# Patient Record
Sex: MALE | Race: WHITE | Employment: FULL TIME | URBAN - METROPOLITAN AREA
[De-identification: names, ages, dates, MRNs, and addresses within clinical notes are randomized per-mention and may not be internally consistent; named-entity substitution may affect disease eponyms.]

---

## 2020-08-29 ENCOUNTER — OFFICE VISIT (OUTPATIENT)
Dept: URGENT CARE | Facility: URGENT CARE | Age: 40
End: 2020-08-29
Payer: COMMERCIAL

## 2020-08-29 ENCOUNTER — NURSE TRIAGE (OUTPATIENT)
Dept: NURSING | Facility: CLINIC | Age: 40
End: 2020-08-29

## 2020-08-29 VITALS
HEART RATE: 102 BPM | SYSTOLIC BLOOD PRESSURE: 142 MMHG | BODY MASS INDEX: 31.4 KG/M2 | TEMPERATURE: 101 F | HEIGHT: 69 IN | WEIGHT: 212 LBS | DIASTOLIC BLOOD PRESSURE: 96 MMHG | OXYGEN SATURATION: 98 %

## 2020-08-29 DIAGNOSIS — R50.9 FEVER, UNSPECIFIED FEVER CAUSE: ICD-10-CM

## 2020-08-29 DIAGNOSIS — H92.01 OTALGIA, RIGHT: Primary | ICD-10-CM

## 2020-08-29 PROCEDURE — 99203 OFFICE O/P NEW LOW 30 MIN: CPT | Performed by: FAMILY MEDICINE

## 2020-08-29 PROCEDURE — U0003 INFECTIOUS AGENT DETECTION BY NUCLEIC ACID (DNA OR RNA); SEVERE ACUTE RESPIRATORY SYNDROME CORONAVIRUS 2 (SARS-COV-2) (CORONAVIRUS DISEASE [COVID-19]), AMPLIFIED PROBE TECHNIQUE, MAKING USE OF HIGH THROUGHPUT TECHNOLOGIES AS DESCRIBED BY CMS-2020-01-R: HCPCS | Performed by: FAMILY MEDICINE

## 2020-08-29 RX ORDER — CIPROFLOXACIN AND DEXAMETHASONE 3; 1 MG/ML; MG/ML
4 SUSPENSION/ DROPS AURICULAR (OTIC) 2 TIMES DAILY
Qty: 2.8 ML | Refills: 0 | Status: SHIPPED | OUTPATIENT
Start: 2020-08-29 | End: 2020-09-05

## 2020-08-29 RX ORDER — IBUPROFEN 400 MG/1
400 TABLET, FILM COATED ORAL EVERY 6 HOURS PRN
COMMUNITY

## 2020-08-29 RX ORDER — ACETAMINOPHEN 325 MG/1
325-650 TABLET ORAL EVERY 6 HOURS PRN
COMMUNITY

## 2020-08-29 RX ORDER — DOXYCYCLINE HYCLATE 100 MG
100 TABLET ORAL 2 TIMES DAILY
Qty: 20 TABLET | Refills: 0 | Status: SHIPPED | OUTPATIENT
Start: 2020-08-29 | End: 2020-09-08

## 2020-08-29 ASSESSMENT — MIFFLIN-ST. JEOR: SCORE: 1872.01

## 2020-08-29 NOTE — PROGRESS NOTES
"Subjective     Luis Kessler is a 39 year old male who presents to clinic today for the following health issues:    HPI     Eye(s) Problem    Chief Complaint   Patient presents with     Urgent Care     Ear Problem     Right ear has been very painful since Thursday evening, had some body aches last evening.  States he's been \"living on pain killers.\"  Has been taking excedrin migraine, tylenol and ibuprofen.     Onset/Duration: 2 days   Description:   Location: right ear pain  Pain: YES  Redness: no  Accompanying Signs & Symptoms:  Discharge/mattering: no  Swelling: no  Visual changes: no  Fever: YES  Nasal Congestion: no  Bothered by bright lights: no  History:  Trauma: no  Foreign body exposure: no  Wearing contacts: no  Precipitating or alleviating factors: None  Therapies tried and outcome: Excedrin, ibuprofen, tylenol   Patient does swimming in use Q-tips for ear cleaning      Review of Systems   Constitutional, HEENT, cardiovascular, pulmonary, gi and gu systems are negative, except as otherwise noted.      Objective    BP (!) 142/96   Pulse 102   Temp 101  F (38.3  C) (Oral)   Ht 1.753 m (5' 9\")   Wt 96.2 kg (212 lb)   SpO2 98%   BMI 31.31 kg/m    Body mass index is 31.31 kg/m .  Physical Exam   GENERAL: alert, no distress and obese  EYES: Eyes grossly normal to inspection, PERRL and conjunctivae and sclerae normal  HENT: normal cephalic/atraumatic, right ear:auricle, tragus tender on palpation, warmth on palpation, slightly erythematous, tympanic membrane not visualized due to impacted cerumen, left ear: occluded with wax, nose and mouth without ulcers or lesions, oropharynx clear and oral mucous membranes moist  NECK: no adenopathy, no asymmetry, masses, or scars and thyroid normal to palpation  RESP: lungs clear to auscultation - no rales, rhonchi or wheezes  CV: tachycardia, normal S1 S2, no S3 or S4 and no murmur, click or rub  ABDOMEN: soft, nontender, no hepatosplenomegaly, no masses and " bowel sounds normal  MS: no gross musculoskeletal defects noted, no edema  SKIN: no suspicious lesions or rashes  NEURO: Normal strength and tone, mentation intact and speech normal      Assessment & Plan       ICD-10-CM    1. Otalgia, right  H92.01 doxycycline hyclate (VIBRA-TABS) 100 MG tablet     ciprofloxacin-dexamethasone (CIPRODEX) 0.3-0.1 % otic suspension   2. Fever, unspecified fever cause  R50.9 Symptomatic COVID-19 Virus (Coronavirus) by PCR       Otalgia, right  --Differentials discussed in detail including but not limited to otitis media, otitis externa, cellulitis, dental infection, parotitis.  Physical examination remarkable for tender tragus pull, unable to visualize tympanic membrane due to impacted cerumen.  Patient allergic to penicillin.  Doxycycline and Ciprodex prescribed for covering right otitis media and otitis externa, common side effects discussed.  Suggested to continue well hydration, over-the-counter analgesia.  We will do COVID-19 as well considering fever.  Instructed to go ER if symptoms worsen over the weekend otherwise follow-up with PCP early next week.  Patient understood and in agreement with above plan.  All questions answered.  - doxycycline hyclate (VIBRA-TABS) 100 MG tablet; Take 1 tablet (100 mg) by mouth 2 times daily for 10 days  - ciprofloxacin-dexamethasone (CIPRODEX) 0.3-0.1 % otic suspension; Place 4 drops into the right ear 2 times daily for 7 days        Nilesh Parker MD  [facility]

## 2020-08-29 NOTE — TELEPHONE ENCOUNTER
"Patient states he had a fever last night that has now resolved.  Complaining of right ear/jaw pain that started on 8/27/20.  States the entire right side of head hurts; hurts to open his mouth.  Has been taking Excedrin, Ibuprofen and icing area which is the only way he is able to get any sleep.  States ear feels like there is fluid in it but no drainage.  Ear and jaw are pink and warm.  Unable to determine if bone behind ear is pink/swollen.  Advised patient be seen at  and provided hours for [facility] per patient's request.    Additional Information    Moving the earlobe or touching the ear clearly increases the pain    Negative: Recently examined and diagnosed with \"Otitis Externa\" or \"Swimmer's Ear\"    Negative: [1] Stiff neck (unable to touch chin to chest) AND [2] fever    Negative: [1] Stiff neck (unable to touch chin to chest) AND [2] headache    Negative: Bony area of skull behind the ear is pink or swollen    Negative: Patient sounds very sick or weak to the triager    [1] SEVERE pain and [2] not improved 2 hours after analgesic medication (e.g., ibuprofen or acetaminophen)    Protocols used: EARACHE-A-AH, EAR - SWIMMER'S-A-AH      "

## 2020-08-31 LAB
SARS-COV-2 RNA SPEC QL NAA+PROBE: NOT DETECTED
SPECIMEN SOURCE: NORMAL

## 2021-01-03 ENCOUNTER — HEALTH MAINTENANCE LETTER (OUTPATIENT)
Age: 41
End: 2021-01-03

## 2021-10-10 ENCOUNTER — HEALTH MAINTENANCE LETTER (OUTPATIENT)
Age: 41
End: 2021-10-10

## 2022-01-27 DIAGNOSIS — Z31.41 FERTILITY TESTING: Primary | ICD-10-CM

## 2022-01-30 ENCOUNTER — HEALTH MAINTENANCE LETTER (OUTPATIENT)
Age: 42
End: 2022-01-30

## 2022-03-18 ENCOUNTER — ANCILLARY PROCEDURE (OUTPATIENT)
Dept: ULTRASOUND IMAGING | Facility: CLINIC | Age: 42
End: 2022-03-18
Attending: NURSE PRACTITIONER
Payer: COMMERCIAL

## 2022-03-18 DIAGNOSIS — N50.89 ENLARGED TESTICLE: ICD-10-CM

## 2022-03-18 PROCEDURE — 76870 US EXAM SCROTUM: CPT | Performed by: RADIOLOGY

## 2022-09-18 ENCOUNTER — HEALTH MAINTENANCE LETTER (OUTPATIENT)
Age: 42
End: 2022-09-18

## 2023-05-07 ENCOUNTER — HEALTH MAINTENANCE LETTER (OUTPATIENT)
Age: 43
End: 2023-05-07

## 2023-09-10 ENCOUNTER — OFFICE VISIT (OUTPATIENT)
Dept: FAMILY MEDICINE | Facility: CLINIC | Age: 43
End: 2023-09-10
Payer: COMMERCIAL

## 2023-09-10 VITALS
SYSTOLIC BLOOD PRESSURE: 142 MMHG | HEART RATE: 103 BPM | TEMPERATURE: 97.6 F | DIASTOLIC BLOOD PRESSURE: 86 MMHG | OXYGEN SATURATION: 97 %

## 2023-09-10 DIAGNOSIS — T63.441A ALLERGIC REACTION TO BEE STING: ICD-10-CM

## 2023-09-10 DIAGNOSIS — J39.2: ICD-10-CM

## 2023-09-10 DIAGNOSIS — T78.3XXA ANGIOEDEMA, INITIAL ENCOUNTER: Primary | ICD-10-CM

## 2023-09-10 PROCEDURE — 99204 OFFICE O/P NEW MOD 45 MIN: CPT

## 2023-09-10 PROCEDURE — 96372 THER/PROPH/DIAG INJ SC/IM: CPT | Performed by: PHYSICIAN ASSISTANT

## 2023-09-10 RX ORDER — EPINEPHRINE 0.3 MG/.3ML
0.3 INJECTION SUBCUTANEOUS ONCE
Status: COMPLETED | OUTPATIENT
Start: 2023-09-10 | End: 2023-09-10

## 2023-09-10 RX ORDER — PREDNISONE 20 MG/1
40 TABLET ORAL DAILY
Qty: 8 TABLET | Refills: 0 | Status: SHIPPED | OUTPATIENT
Start: 2023-09-11 | End: 2023-09-15

## 2023-09-10 RX ORDER — EPINEPHRINE 0.3 MG/.3ML
0.3 INJECTION SUBCUTANEOUS PRN
Qty: 2 EACH | Refills: 1 | Status: SHIPPED | OUTPATIENT
Start: 2023-09-10

## 2023-09-10 RX ORDER — METHYLPREDNISOLONE SOD SUCC 125 MG
125 VIAL (EA) INJECTION ONCE
Status: COMPLETED | OUTPATIENT
Start: 2023-09-10 | End: 2023-09-10

## 2023-09-10 RX ADMIN — Medication 125 MG: at 16:16

## 2023-09-10 RX ADMIN — EPINEPHRINE 0.3 MG: 0.3 INJECTION SUBCUTANEOUS at 16:23

## 2023-09-10 ASSESSMENT — ENCOUNTER SYMPTOMS
FEVER: 0
SORE THROAT: 1
NAUSEA: 0
TROUBLE SWALLOWING: 1
WHEEZING: 0
VOICE CHANGE: 1
VOMITING: 0
CHILLS: 0
SHORTNESS OF BREATH: 1
ABDOMINAL PAIN: 0
CHEST TIGHTNESS: 0

## 2023-09-10 NOTE — PATIENT INSTRUCTIONS
Follow up in the Emergency room for further evaluation at the onset of tongue swelling, shortness of breath, lip swelling.  Use the EpiPen first, and call 911.  Follow-up with Allergist for further evaluation.

## 2023-09-10 NOTE — PROGRESS NOTES
Assessment & Plan      1. Angioedema, initial encounter    -Patient was given IM Solu-Medrol in the clinic.  Patient tolerated the medication well.  Patient reported reduction in tongue swelling after 30 minutes.  He was able to talk in a normal voice and swallow.  The lip swelling improved. Physical exam showed tongue swelling resolving, uvula midline and pharynx edema resolved.     - methylPREDNISolone sodium succinate (solu-MEDROL) injection 125 mg  - EPINEPHrine (ANY BX GENERIC EQUIV) injection 0.3 mg  - EPINEPHrine (ANY BX GENERIC EQUIV) 0.3 MG/0.3ML injection 2-pack; Inject 0.3 mLs (0.3 mg) into the muscle as needed for anaphylaxis May repeat one time in 5-15 minutes if response to initial dose is inadequate.  Dispense: 2 each; Refill: 1  - predniSONE (DELTASONE) 20 MG tablet; Take 2 tablets (40 mg) by mouth daily for 4 days  Dispense: 8 tablet; Refill: 0    2. Allergic reaction to bee sting    -Patient was discharged with EpiPen's.  Instructions on how to use the EpiPen's were given to patient's wife.  Emergent referral to Allergist completed.  Patient will also start prednisone tomorrow, 40 mg once daily  for 4 days.    - methylPREDNISolone sodium succinate (solu-MEDROL) injection 125 mg  - EPINEPHrine (ANY BX GENERIC EQUIV) injection 0.3 mg  - EPINEPHrine (ANY BX GENERIC EQUIV) 0.3 MG/0.3ML injection 2-pack; Inject 0.3 mLs (0.3 mg) into the muscle as needed for anaphylaxis May repeat one time in 5-15 minutes if response to initial dose is inadequate.  Dispense: 2 each; Refill: 1  - Adult Allergy/Asthma  Referral; Future  - predniSONE (DELTASONE) 20 MG tablet; Take 2 tablets (40 mg) by mouth daily for 4 days  Dispense: 8 tablet; Refill: 0    3. Edema of pharynx    - EPINEPHrine (ANY BX GENERIC EQUIV) injection 0.3 mg  - EPINEPHrine (ANY BX GENERIC EQUIV) 0.3 MG/0.3ML injection 2-pack; Inject 0.3 mLs (0.3 mg) into the muscle as needed for anaphylaxis May repeat one time in 5-15 minutes if response to  initial dose is inadequate.  Dispense: 2 each; Refill: 1      Patient Instructions   Follow up in the Emergency room for further evaluation at the onset of tongue swelling, shortness of breath, lip swelling.  Use the EpiPen first, and call 911.  Follow-up with Allergist for further evaluation.    Return if symptoms worsen or fail to improve, for Follow up.    At the end of the encounter, I discussed results, diagnosis, medications. Discussed red flags for immediate return to clinic/ER, as well as indications for follow up if no improvement. Patient understood and agreed to plan. Patient was stable for discharge.    David Smith is a 42 year old male who presents to clinic today with wife for the following health issues:  Chief Complaint   Patient presents with    Insect Bite     Bee sting on tongue x 2 hours ago- took two benadryl and I Advil  Difficulty swallowing, swollen, tongue and lip       HPI    Patient reports bee sting on the tongue x 2 hours ago.  Patient reports difficult swallowing, tongue swelling, throat swelling and lip swelling.  He is having hard time talking or opening his mouth. His voice is hoarse and has mild shortness of breath. He took Benadryl and Advil. Denies wheezing, chest pain, chest tightness, rash or hives. He has had bee sting before on other body parts and usually has swelling which resolves after one week.     Review of Systems   Constitutional:  Negative for chills and fever.   HENT:  Positive for sore throat, trouble swallowing and voice change.    Respiratory:  Positive for shortness of breath. Negative for chest tightness and wheezing.    Gastrointestinal:  Negative for abdominal pain, nausea and vomiting.   All other systems reviewed and are negative.      Problem List:  There are no relevant problems documented for this patient.      No past medical history on file.    Social History     Tobacco Use    Smoking status: Never    Smokeless tobacco: Never   Substance Use  Topics    Alcohol use: Not on file           Objective    BP (!) 142/86   Pulse 103   Temp 97.6  F (36.4  C) (Tympanic)   SpO2 97%   Physical Exam  HENT:      Head: Normocephalic.      Mouth/Throat:      Mouth: Angioedema present.      Pharynx: Uvula midline. Pharyngeal swelling present.      Comments: Tongue edema   Lower lip swelling    Cardiovascular:      Rate and Rhythm: Normal rate and regular rhythm.   Pulmonary:      Effort: Pulmonary effort is normal.      Breath sounds: Normal breath sounds.   Lymphadenopathy:      Head:      Right side of head: No submental, submandibular or tonsillar adenopathy.      Left side of head: No submental, submandibular or tonsillar adenopathy.      Cervical: No cervical adenopathy.      Right cervical: No superficial cervical adenopathy.     Left cervical: No superficial cervical adenopathy.   Skin:     General: Skin is warm and dry.      Findings: No rash.   Neurological:      Mental Status: He is alert.   Psychiatric:         Mood and Affect: Mood normal.         Behavior: Behavior normal.              Renae Mccall PA-C

## 2023-09-11 ENCOUNTER — TELEPHONE (OUTPATIENT)
Dept: ALLERGY | Facility: CLINIC | Age: 43
End: 2023-09-11
Payer: COMMERCIAL

## 2023-09-11 NOTE — TELEPHONE ENCOUNTER
This encounter is being sent to inform the clinic that this patient has a referral from Renae Mccall PA-C in [facility] for the diagnoses of Allergic reaction to bee sting and has requested that this patient be seen within Emergency: 1-2 Days. Based on the availability of our provider(s), we are unable to accommodate this request.    Were all sites offered this patient?  Yes  Patient prefers [facility] or [facility]  Does scheduling algorithm request to schedule next available?  Patient has been scheduled for the first available opening with Prudencio Early MD  on 1/5/24.  We have informed the patient that the clinic will review their referral and reach out if a sooner appointment is medically necessary.      Can leave a detailed message at [phone]

## 2023-09-11 NOTE — TELEPHONE ENCOUNTER
Called pt and scheduled for 10/3/23 at 1 pm. Pt went to ED due to being bee stung. pt feels stable and is taking Benadryl and  Prednisone. Patient verbalized good understanding. No further questions      Jerson LEWIS MA.

## 2023-09-11 NOTE — TELEPHONE ENCOUNTER
Reviewed case with Dr. Early. OK to schedule in [facility] spot.    Left message for patient to return call to clinic to be scheduled.    ELIA HahnN, RN  9/11/2023 11:39 AM

## 2023-10-03 ENCOUNTER — OFFICE VISIT (OUTPATIENT)
Dept: ALLERGY | Facility: CLINIC | Age: 43
End: 2023-10-03
Attending: PHYSICIAN ASSISTANT
Payer: COMMERCIAL

## 2023-10-03 ENCOUNTER — LAB (OUTPATIENT)
Dept: LAB | Facility: CLINIC | Age: 43
End: 2023-10-03
Payer: COMMERCIAL

## 2023-10-03 VITALS
WEIGHT: 206.4 LBS | OXYGEN SATURATION: 98 % | BODY MASS INDEX: 30.48 KG/M2 | HEART RATE: 80 BPM | DIASTOLIC BLOOD PRESSURE: 86 MMHG | SYSTOLIC BLOOD PRESSURE: 136 MMHG | RESPIRATION RATE: 16 BRPM

## 2023-10-03 DIAGNOSIS — T36.0X5A ADVERSE EFFECT OF PENICILLIN, INITIAL ENCOUNTER: Primary | ICD-10-CM

## 2023-10-03 DIAGNOSIS — T63.441A ALLERGIC REACTION TO BEE STING: ICD-10-CM

## 2023-10-03 PROCEDURE — 86003 ALLG SPEC IGE CRUDE XTRC EA: CPT | Mod: 59

## 2023-10-03 PROCEDURE — 99203 OFFICE O/P NEW LOW 30 MIN: CPT | Performed by: INTERNAL MEDICINE

## 2023-10-03 PROCEDURE — 86003 ALLG SPEC IGE CRUDE XTRC EA: CPT

## 2023-10-03 PROCEDURE — 36415 COLL VENOUS BLD VENIPUNCTURE: CPT

## 2023-10-03 RX ORDER — LISINOPRIL 10 MG/1
1 TABLET ORAL
COMMUNITY
Start: 2023-08-27

## 2023-10-03 RX ORDER — PREDNISONE 50 MG/1
50 TABLET ORAL DAILY PRN
Qty: 10 TABLET | Refills: 1 | Status: SHIPPED | OUTPATIENT
Start: 2023-10-03

## 2023-10-03 ASSESSMENT — ENCOUNTER SYMPTOMS
COUGH: 0
FEVER: 0

## 2023-10-03 NOTE — PATIENT INSTRUCTIONS
If stung by a bee, take 50 mg of prednisone and 10 mg of Zyrtec  Will check for a bee allergy by blood testing  Carry an Epipen for now (until we see the results of the blood tests)  Your reaction sounds like a large local reaction, which you unfortunately got stung on the tongue.   Let me know if you would like to do an oral challenge to Amoxicillin in the future      Allergy Staff Appt Hours Shot Hours Location       Physician   Prudencio Early MD      Support Staff   KAYLIN Lanza, KAYLIN CORTEZ MA         Mondays Tuesdays Thursdays and Fridays:      [facility] 7-5      Wednesdays         Close                Mondays, Tuesdays and Fridays:  7:20 - 3:40              [facility]  [address] [facility] [address]  [place] [address]  Allergy appointment  line: [phone]    Pulmonary Function Scheduling:  [place]: [phone]           Questions about cost of your care  For questions about your cost of your visit, procedure, lab or imaging contact: [employer] Price Line [phone] or visit:  [email or web address]    Prescription Assistance  If you need assistance with your prescriptions (cost, coverage, etc) please contact: [employer] Prescription Assistance Program [phone]    Important Scheduling Information  All visits for food challenges, medication/drug allergy testing, and drug challenges MUST be scheduled through the allergy clinic nurse. Please contact them via [employer] or by calling the clinic at [phone] and asking to speak with an allergy nurse. They will provide additional information and instructions for the appointment. Discontinue oral antihistamines 7 days prior to the appointment. Discontinue nasal and ocular antihistamines 1 day prior to the appointment.    Appointments for skin testing: Appointment will last approximately 45 minutes.  Please call the appointment line for your clinic to schedule.  Discontinue oral  antihistamines 7 days prior to the appointment.  Discontinue nasal and ocular antihistamines 1 days prior to appointment.    Thank you for trusting us with your care. Please feel free to contact us with any questions or concerns you may have.

## 2023-10-03 NOTE — LETTER
10/3/2023         RE: Luis Kessler  [address]  [facility] [address]        Dear Colleague,    Thank you for referring your patient, Luis Kessler, to the [facility]. Please see a copy of my visit note below.    Luis Kessler was seen in the Allergy Clinic at [facility].    Luis Kessler is a 42 year old male being seen today at the request of Renae Mccall PA-C  in consultation for Allergic reaction to bee sting.   He was stung on the tongue on September 10 and received multiple therapies including epinephrine and Solu-Medrol.  The tongue swelling did improve.  He was having difficulty swallowing as well as talking.  He had not been stung on the tongue in the past.  When he has been stung on the feet he will have swelling of his feet for a couple weeks where it is difficult to wear shoes.  He has not had systemic symptoms such as hives or shortness of breath or vomiting.  He was prescribed an EpiPen.  He is here for venom allergy evaluation.    In addition he does have a penicillin allergy listed.  He is wondering if he should have that evaluated why he is here today.    No past medical history on file.  No family history on file.  No past surgical history on file.    ENVIRONMENTAL HISTORY:   Pets inside the house include None.  Do you smoke cigarettes or other recreational drugs? No There is/are 0 smokers living in the house. The house does not have a damp basement.     SOCIAL HISTORY:   Luis is employed as . He lives with his family.      Review of Systems   Constitutional:  Negative for fever.   HENT:  Negative for sneezing.    Respiratory:  Negative for cough.    Skin:  Negative for rash.         Current Outpatient Medications:      lisinopril (ZESTRIL) 10 MG tablet, Take 1 tablet by mouth daily at 2 pm, Disp: , Rfl:      predniSONE (DELTASONE) 50 MG tablet, Take 1 tablet (50 mg) by mouth daily as needed (Take if  stung by a bee), Disp: 10 tablet, Rfl: 1     acetaminophen (TYLENOL) 325 MG tablet, Take 325-650 mg by mouth every 6 hours as needed for mild pain, Disp: , Rfl:      aspirin-acetaminophen-caffeine (EXCEDRIN MIGRAINE) 250-250-65 MG tablet, Take 1 tablet by mouth every 6 hours as needed for headaches, Disp: , Rfl:      EPINEPHrine (ANY BX GENERIC EQUIV) 0.3 MG/0.3ML injection 2-pack, Inject 0.3 mLs (0.3 mg) into the muscle as needed for anaphylaxis May repeat one time in 5-15 minutes if response to initial dose is inadequate., Disp: 2 each, Rfl: 1     ibuprofen (ADVIL/MOTRIN) 400 MG tablet, Take 400 mg by mouth every 6 hours as needed for moderate pain, Disp: , Rfl:   Allergies   Allergen Reactions     Penicillins                EXAM:   /86 (BP Location: Left arm, Patient Position: Sitting, Cuff Size: Adult Regular)   Pulse 80   Resp 16   Wt 93.6 kg (206 lb 6.4 oz)   SpO2 98%   BMI 30.48 kg/m      Physical Exam    Constitutional:       General: He is not in acute distress.     Appearance: Normal appearance. He is not ill-appearing.   HENT:      Head: Normocephalic and atraumatic.   Eyes:      General:         Right eye: No discharge.         Left eye: No discharge.   Neurological:      General: No focal deficit present.      Mental Status: He is alert. Mental status is at baseline.   Psychiatric:         Mood and Affect: Mood normal.         Behavior: Behavior normal.        ASSESSMENT/PLAN:  Luis Kessler is a 42 year old male seen today for venom allergy evaluation.  He was stung on the tongue and developed lip swelling resulting in difficulty breathing as well as difficulty speaking.  He did receive treatment including epinephrine and Solu-Medrol.  He was prescribed epinephrine.  He has had a history of large local reactions when stung on the feet in the past.  We will order IgE levels to the venoms.  He has not had symptoms concerning for systemic reactions but it sounds consistent with a large  local reaction. The sting on the tongue caused significant symptoms.    Due to the history of large local reactions we will prescribe prednisone to have on hand to take if he is stung in the future.  This should help minimize symptoms.  If he does have IgE elevation will discuss other options such as venom immunotherapy.    If stung by a bee, take 50 mg of prednisone and 10 mg of Zyrtec  Will check for a bee allergy by blood testing  Carry an Epipen for now (until we see the results of the blood tests)  Your reaction sounds like a large local reaction, which you unfortunately got stung on the tongue.   The penicillin oral challenge was discussed today.  He wanted to try to talk to the [employer] insurance line to get an estimate of cost.  He was placed on hold for 20 minutes.  He will look into this further and consider doing this at a later date.    Follow-up if he has positive blood tests to the bees.      Thank you for allowing me to participate in the care of Luis Kessler.      I spent 30 minutes on the date of the encounter doing chart review, history and exam, documentation and further coordination as noted above exclusive of separately reported interpretations    Prudencio Early MD  Allergy/Immunology  [facility]         Again, thank you for allowing me to participate in the care of your patient.        Sincerely,        Prudencio Early MD

## 2023-10-03 NOTE — PROGRESS NOTES
Luis Kessler was seen in the Allergy Clinic at [facility].    Luis Kessler is a 42 year old male being seen today at the request of Renae Mccall PA-C  in consultation for Allergic reaction to bee sting.   He was stung on the tongue on September 10 and received multiple therapies including epinephrine and Solu-Medrol.  The tongue swelling did improve.  He was having difficulty swallowing as well as talking.  He had not been stung on the tongue in the past.  When he has been stung on the feet he will have swelling of his feet for a couple weeks where it is difficult to wear shoes.  He has not had systemic symptoms such as hives or shortness of breath or vomiting.  He was prescribed an EpiPen.  He is here for venom allergy evaluation.    In addition he does have a penicillin allergy listed.  He is wondering if he should have that evaluated why he is here today.    No past medical history on file.  No family history on file.  No past surgical history on file.    ENVIRONMENTAL HISTORY:   Pets inside the house include None.  Do you smoke cigarettes or other recreational drugs? No There is/are 0 smokers living in the house. The house does not have a damp basement.     SOCIAL HISTORY:   Luis is employed as . He lives with his family.      Review of Systems   Constitutional:  Negative for fever.   HENT:  Negative for sneezing.    Respiratory:  Negative for cough.    Skin:  Negative for rash.         Current Outpatient Medications:     lisinopril (ZESTRIL) 10 MG tablet, Take 1 tablet by mouth daily at 2 pm, Disp: , Rfl:     predniSONE (DELTASONE) 50 MG tablet, Take 1 tablet (50 mg) by mouth daily as needed (Take if stung by a bee), Disp: 10 tablet, Rfl: 1    acetaminophen (TYLENOL) 325 MG tablet, Take 325-650 mg by mouth every 6 hours as needed for mild pain, Disp: , Rfl:     aspirin-acetaminophen-caffeine (EXCEDRIN MIGRAINE) 250-250-65 MG tablet, Take 1 tablet by mouth  every 6 hours as needed for headaches, Disp: , Rfl:     EPINEPHrine (ANY BX GENERIC EQUIV) 0.3 MG/0.3ML injection 2-pack, Inject 0.3 mLs (0.3 mg) into the muscle as needed for anaphylaxis May repeat one time in 5-15 minutes if response to initial dose is inadequate., Disp: 2 each, Rfl: 1    ibuprofen (ADVIL/MOTRIN) 400 MG tablet, Take 400 mg by mouth every 6 hours as needed for moderate pain, Disp: , Rfl:   Allergies   Allergen Reactions    Penicillins                EXAM:   /86 (BP Location: Left arm, Patient Position: Sitting, Cuff Size: Adult Regular)   Pulse 80   Resp 16   Wt 93.6 kg (206 lb 6.4 oz)   SpO2 98%   BMI 30.48 kg/m      Physical Exam    Constitutional:       General: He is not in acute distress.     Appearance: Normal appearance. He is not ill-appearing.   HENT:      Head: Normocephalic and atraumatic.   Eyes:      General:         Right eye: No discharge.         Left eye: No discharge.   Neurological:      General: No focal deficit present.      Mental Status: He is alert. Mental status is at baseline.   Psychiatric:         Mood and Affect: Mood normal.         Behavior: Behavior normal.        ASSESSMENT/PLAN:  Luis Kessler is a 42 year old male seen today for venom allergy evaluation.  He was stung on the tongue and developed lip swelling resulting in difficulty breathing as well as difficulty speaking.  He did receive treatment including epinephrine and Solu-Medrol.  He was prescribed epinephrine.  He has had a history of large local reactions when stung on the feet in the past.  We will order IgE levels to the venoms.  He has not had symptoms concerning for systemic reactions but it sounds consistent with a large local reaction. The sting on the tongue caused significant symptoms.    Due to the history of large local reactions we will prescribe prednisone to have on hand to take if he is stung in the future.  This should help minimize symptoms.  If he does have IgE elevation  will discuss other options such as venom immunotherapy.    If stung by a bee, take 50 mg of prednisone and 10 mg of Zyrtec  Will check for a bee allergy by blood testing  Carry an Epipen for now (until we see the results of the blood tests)  Your reaction sounds like a large local reaction, which you unfortunately got stung on the tongue.   The penicillin oral challenge was discussed today.  He wanted to try to talk to the [employer] insurance line to get an estimate of cost.  He was placed on hold for 20 minutes.  He will look into this further and consider doing this at a later date.    Follow-up if he has positive blood tests to the bees.      Thank you for allowing me to participate in the care of Luis IRVING Galvanon.      I spent 30 minutes on the date of the encounter doing chart review, history and exam, documentation and further coordination as noted above exclusive of separately reported interpretations    Prudencio Early MD  Allergy/Immunology  [facility]

## 2023-10-04 LAB
HONEY BEE IGE QN: <0.1 KU(A)/L
PAPER WASP IGE QN: 4.55 KU(A)/L
WHITEFACED HORNET IGE QN: 2.15 KU(A)/L
YELLOW HORNET IGE QN: 0.12 KU(A)/L
YELLOW JACKET IGE QN: 7.58 KU(A)/L

## 2024-07-14 ENCOUNTER — HEALTH MAINTENANCE LETTER (OUTPATIENT)
Age: 44
End: 2024-07-14

## 2024-08-15 ENCOUNTER — OFFICE VISIT (OUTPATIENT)
Dept: FAMILY MEDICINE | Facility: CLINIC | Age: 44
End: 2024-08-15
Payer: COMMERCIAL

## 2024-08-15 VITALS
SYSTOLIC BLOOD PRESSURE: 142 MMHG | HEART RATE: 87 BPM | WEIGHT: 218 LBS | TEMPERATURE: 98.4 F | BODY MASS INDEX: 31.21 KG/M2 | HEIGHT: 70 IN | OXYGEN SATURATION: 98 % | RESPIRATION RATE: 18 BRPM | DIASTOLIC BLOOD PRESSURE: 100 MMHG

## 2024-08-15 DIAGNOSIS — Z13.6 SCREENING FOR HEART DISEASE: ICD-10-CM

## 2024-08-15 DIAGNOSIS — I10 BENIGN ESSENTIAL HYPERTENSION: ICD-10-CM

## 2024-08-15 DIAGNOSIS — Z00.00 ENCOUNTER FOR ANNUAL PHYSICAL EXAM: Primary | ICD-10-CM

## 2024-08-15 PROCEDURE — 99214 OFFICE O/P EST MOD 30 MIN: CPT | Mod: 25

## 2024-08-15 PROCEDURE — 90472 IMMUNIZATION ADMIN EACH ADD: CPT

## 2024-08-15 PROCEDURE — 90746 HEPB VACCINE 3 DOSE ADULT IM: CPT

## 2024-08-15 PROCEDURE — 90715 TDAP VACCINE 7 YRS/> IM: CPT

## 2024-08-15 PROCEDURE — 90471 IMMUNIZATION ADMIN: CPT

## 2024-08-15 PROCEDURE — 99396 PREV VISIT EST AGE 40-64: CPT | Mod: 25

## 2024-08-15 RX ORDER — LISINOPRIL 10 MG/1
10 TABLET ORAL DAILY
Qty: 90 TABLET | Refills: 1 | Status: SHIPPED | OUTPATIENT
Start: 2024-08-15

## 2024-08-15 SDOH — HEALTH STABILITY: PHYSICAL HEALTH: ON AVERAGE, HOW MANY DAYS PER WEEK DO YOU ENGAGE IN MODERATE TO STRENUOUS EXERCISE (LIKE A BRISK WALK)?: 3 DAYS

## 2024-08-15 SDOH — HEALTH STABILITY: PHYSICAL HEALTH: ON AVERAGE, HOW MANY MINUTES DO YOU ENGAGE IN EXERCISE AT THIS LEVEL?: 30 MIN

## 2024-08-15 ASSESSMENT — PAIN SCALES - GENERAL: PAINLEVEL: NO PAIN (0)

## 2024-08-15 ASSESSMENT — SOCIAL DETERMINANTS OF HEALTH (SDOH): HOW OFTEN DO YOU GET TOGETHER WITH FRIENDS OR RELATIVES?: MORE THAN THREE TIMES A WEEK

## 2024-08-15 NOTE — PROGRESS NOTES
"Preventive Care Visit  [facility]GR[facility]  Tez Miller NP, Nurse Practitioner Primary Care  Aug 15, 2024      Assessment & Plan     Annual physical  Discussed setting goals that include caffeine reduction (soda), exercise SMART goals today    Benign essential hypertension  - lisinopril (ZESTRIL) 10 MG tablet; Take 1 tablet (10 mg) by mouth daily  - Basic metabolic panel  (Ca, Cl, CO2, Creat, Gluc, K, Na, BUN); Future  - Albumin Random Urine Quantitative with Creat Ratio; Future  BP uncontrolled. Previously on lisinopril, which worked well, but he did have some side effects (\"withdrawal\" of headaches, dry mouth, feverish).    Screening for heart disease  - Lipid panel reflex to direct LDL Fasting; Future    Patient has been advised of split billing requirements and indicates understanding: Yes    Subjective   Luis is a 43 year old, presenting for the following:  Physical      HTN: Was previously on lisinopril. Reports feeling possible \"withdrawal\" with lisinopril- dry mouth, headachey, feverish when missing doses.  Diet: Will eat everything. Feels pretty good about eating fruits/vegetables. Hoping to reduce soda (regular coke- 1 at lunch, 1 at dinner).  Coffee: 5-6  cups/day  Exercise: Not exercising as much as he used to. Jogging 3x the past week.  Sleep: pretty good.     Occupation: changed jobs 2 years ago. 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 .  Occupation: wife-Bambi SCHUL[employer] 2 children- 5, 2.      8/15/2024     2:56 PM   Additional Questions   Roomed by Leon GREER        8/15/2024   General Health   How would you rate your overall physical health? Good   Feel stress (tense, anxious, or unable to sleep) Only a little      (!) STRESS CONCERN      8/15/2024   Nutrition   Three or more servings of calcium each day? Yes   Diet: Regular (no restrictions)   How many servings of " fruit and vegetables per day? 4 or more   How many sweetened beverages each day? (!) 2            8/15/2024   Exercise   Days per week of moderate/strenous exercise 3 days   Average minutes spent exercising at this level 30 min            8/15/2024   Social Factors   Frequency of gathering with friends or relatives More than three times a week   Worry food won't last until get money to buy more No   Food not last or not have enough money for food? No   Do you have housing? (Housing is defined as stable permanent housing and does not include staying ouside in a car, in a tent, in an abandoned building, in an overnight shelter, or couch-surfing.) Yes   Are you worried about losing your housing? No   Lack of transportation? No   Unable to get utilities (heat,electricity)? No            8/15/2024   Dental   Dentist two times every year? (!) NO - 1x/year.   Eye doctor- not too often anymore. Had lenses inserted into eyes.        8/15/2024   TB Screening   Were you born outside of the [place]? No            Today's PHQ-2 Score:       8/15/2024     2:42 PM   PHQ-2 ( 1999 [employer])   Q1: Little interest or pleasure in doing things 0   Q2: Feeling down, depressed or hopeless 1   PHQ-2 Score 1   Q1: Little interest or pleasure in doing things Not at all   Q2: Feeling down, depressed or hopeless Several days   PHQ-2 Score 1           8/15/2024   Substance Use   Alcohol more than 3/day or more than 7/wk No   Do you use any other substances recreationally? No        Social History     Tobacco Use    Smoking status: Never    Smokeless tobacco: Never   Vaping Use    Vaping status: Never Used             8/15/2024   One time HIV Screening   Previous HIV test? No          8/15/2024   STI Screening   New sexual partner(s) since last STI/HIV test? No      ASCVD Risk   The ASCVD Risk score (Sobia CAR, et al., 2019) failed to calculate for the following reasons:    Cannot find a previous HDL lab    Cannot find a previous total  "cholesterol lab        8/15/2024   Contraception/Family Planning   Questions about contraception or family planning No           Reviewed and updated as needed this visit by Provider                       Objective    Exam  BP (!) 149/95 (BP Location: Right arm, Patient Position: Sitting, Cuff Size: Adult Large)   Pulse 87   Temp 98.4  F (36.9  C) (Temporal)   Resp 18   Ht 1.778 m (5' 10\")   Wt 98.9 kg (218 lb)   SpO2 98%   BMI 31.28 kg/m     Estimated body mass index is 31.28 kg/m  as calculated from the following:    Height as of this encounter: 1.778 m (5' 10\").    Weight as of this encounter: 98.9 kg (218 lb).    Physical Exam  GENERAL: alert and no distress  EYES: Eyes grossly normal to inspection, PERRL and conjunctivae and sclerae normal  HENT: ear canals and TM's normal, nose and mouth without ulcers or lesions  NECK: no adenopathy, no asymmetry, masses, or scars  RESP: lungs clear to auscultation - no rales, rhonchi or wheezes  CV: regular rate and rhythm, normal S1 S2, no S3 or S4, no murmur, click or rub, no peripheral edema  ABDOMEN: soft, nontender, no hepatosplenomegaly, no masses and bowel sounds normal  MS: no gross musculoskeletal defects noted, no edema  SKIN: no suspicious lesions or rashes  NEURO: Normal strength and tone, mentation intact and speech normal  PSYCH: mentation appears normal, affect normal/bright        Signed Electronically by: Tez Miller NP    "

## 2024-08-16 ENCOUNTER — LAB (OUTPATIENT)
Dept: LAB | Facility: CLINIC | Age: 44
End: 2024-08-16
Payer: COMMERCIAL

## 2024-08-16 DIAGNOSIS — I10 BENIGN ESSENTIAL HYPERTENSION: ICD-10-CM

## 2024-08-16 DIAGNOSIS — Z13.6 SCREENING FOR HEART DISEASE: ICD-10-CM

## 2024-08-16 LAB
ANION GAP SERPL CALCULATED.3IONS-SCNC: 9 MMOL/L (ref 7–15)
BUN SERPL-MCNC: 9.8 MG/DL (ref 6–20)
CALCIUM SERPL-MCNC: 9.1 MG/DL (ref 8.8–10.4)
CHLORIDE SERPL-SCNC: 105 MMOL/L (ref 98–107)
CHOLEST SERPL-MCNC: 162 MG/DL
CREAT SERPL-MCNC: 0.71 MG/DL (ref 0.67–1.17)
CREAT UR-MCNC: 111 MG/DL
EGFRCR SERPLBLD CKD-EPI 2021: >90 ML/MIN/1.73M2
FASTING STATUS PATIENT QL REPORTED: YES
FASTING STATUS PATIENT QL REPORTED: YES
GLUCOSE SERPL-MCNC: 92 MG/DL (ref 70–99)
HCO3 SERPL-SCNC: 24 MMOL/L (ref 22–29)
HDLC SERPL-MCNC: 36 MG/DL
LDLC SERPL CALC-MCNC: 100 MG/DL
MICROALBUMIN UR-MCNC: <12 MG/L
MICROALBUMIN/CREAT UR: NORMAL MG/G{CREAT}
NONHDLC SERPL-MCNC: 126 MG/DL
POTASSIUM SERPL-SCNC: 4.4 MMOL/L (ref 3.4–5.3)
SODIUM SERPL-SCNC: 138 MMOL/L (ref 135–145)
TRIGL SERPL-MCNC: 128 MG/DL

## 2024-08-16 PROCEDURE — 36415 COLL VENOUS BLD VENIPUNCTURE: CPT

## 2024-08-16 PROCEDURE — 82043 UR ALBUMIN QUANTITATIVE: CPT

## 2024-08-16 PROCEDURE — 80061 LIPID PANEL: CPT

## 2024-08-16 PROCEDURE — 80048 BASIC METABOLIC PNL TOTAL CA: CPT

## 2024-08-16 PROCEDURE — 82570 ASSAY OF URINE CREATININE: CPT

## 2025-02-11 DIAGNOSIS — I10 BENIGN ESSENTIAL HYPERTENSION: ICD-10-CM

## 2025-02-11 RX ORDER — LISINOPRIL 10 MG/1
10 TABLET ORAL DAILY
Qty: 90 TABLET | Refills: 1 | Status: SHIPPED | OUTPATIENT
Start: 2025-02-11

## 2025-07-21 ENCOUNTER — PATIENT OUTREACH (OUTPATIENT)
Dept: CARE COORDINATION | Facility: CLINIC | Age: 45
End: 2025-07-21
Payer: COMMERCIAL

## 2025-08-18 DIAGNOSIS — I10 BENIGN ESSENTIAL HYPERTENSION: ICD-10-CM

## 2025-08-18 RX ORDER — LISINOPRIL 10 MG/1
10 TABLET ORAL DAILY
Qty: 90 TABLET | Refills: 0 | Status: SHIPPED | OUTPATIENT
Start: 2025-08-18